# Patient Record
Sex: FEMALE | Race: WHITE | Employment: FULL TIME | URBAN - NONMETROPOLITAN AREA
[De-identification: names, ages, dates, MRNs, and addresses within clinical notes are randomized per-mention and may not be internally consistent; named-entity substitution may affect disease eponyms.]

---

## 2024-04-24 ENCOUNTER — HOSPITAL ENCOUNTER (OUTPATIENT)
Age: 19
Discharge: HOME | End: 2024-04-26
Payer: COMMERCIAL

## 2024-04-24 ENCOUNTER — OFFICE VISIT (OUTPATIENT)
Dept: CARDIOLOGY | Age: 19
End: 2024-04-24
Payer: COMMERCIAL

## 2024-04-24 VITALS
BODY MASS INDEX: 21.69 KG/M2 | HEIGHT: 66 IN | OXYGEN SATURATION: 99 % | SYSTOLIC BLOOD PRESSURE: 117 MMHG | WEIGHT: 135 LBS | DIASTOLIC BLOOD PRESSURE: 79 MMHG | HEART RATE: 84 BPM | RESPIRATION RATE: 18 BRPM

## 2024-04-24 DIAGNOSIS — G90.A POTS (POSTURAL ORTHOSTATIC TACHYCARDIA SYNDROME): ICD-10-CM

## 2024-04-24 DIAGNOSIS — R00.2 PALPITATIONS: ICD-10-CM

## 2024-04-24 DIAGNOSIS — G90.A POTS (POSTURAL ORTHOSTATIC TACHYCARDIA SYNDROME): Primary | ICD-10-CM

## 2024-04-24 DIAGNOSIS — R55 SYNCOPE, UNSPECIFIED SYNCOPE TYPE: ICD-10-CM

## 2024-04-24 DIAGNOSIS — R07.9 CHEST PAIN, UNSPECIFIED TYPE: ICD-10-CM

## 2024-04-24 LAB — ECHO BSA: 1.69 M2

## 2024-04-24 PROCEDURE — 93000 ELECTROCARDIOGRAM COMPLETE: CPT | Performed by: INTERNAL MEDICINE

## 2024-04-24 PROCEDURE — 93243 EXT ECG>48HR<7D SCAN A/R: CPT

## 2024-04-24 PROCEDURE — 99204 OFFICE O/P NEW MOD 45 MIN: CPT | Performed by: PHYSICIAN ASSISTANT

## 2024-04-24 RX ORDER — CITALOPRAM 20 MG/1
20 TABLET ORAL DAILY
COMMUNITY
Start: 2024-01-25 | End: 2025-01-24

## 2024-04-24 RX ORDER — FLUDROCORTISONE ACETATE 0.1 MG/1
1 TABLET ORAL DAILY
COMMUNITY

## 2024-04-24 RX ORDER — ACETAMINOPHEN AND CODEINE PHOSPHATE 120; 12 MG/5ML; MG/5ML
1 SOLUTION ORAL DAILY
COMMUNITY

## 2024-04-24 RX ORDER — MELOXICAM 15 MG/1
TABLET ORAL
COMMUNITY
Start: 2024-02-06

## 2024-04-24 NOTE — PROGRESS NOTES
I, Denisa Almonte am scribing for and in the presence of Unique Everett PA-C.    Patient: Jazmín Crandall  : 2005  Date of Visit: 2024    REASON FOR VISIT / CONSULTATION: New Patient (Pt is here to establish care for POTS. Pt was in ER on 24 for syncope. Pt has passed out 2 dozen times since last August. She said when this happens hear heart flutters, dizziness, her mouth gets dry, chest can gets tight, clammy and sweating. She was born with a heart murmur and had to see a cardiologist when she was young and stopped seeing the cardiologist was when she was 10. She saw a neurologist and was told she does not have seizures. She was told she has pulmonary artery stenosis.)      History of Present Illness:        Dear VENESSA TAVERA, APRN - CNP,     I had the pleasure of seeing Jazmín Crandall today. Ms. Crandall is a 18 y.o. female  with a history of POTS. She also has asthma and previously pulmonary artery stenosis.     She previously saw [facility]ology for her pulmonary valve stenosis (resolved per echo 2016). Last appointment with them was 2021: Per summary of last office note, \"Her dizziness postural related and most likely of a neurocardiogenic etiology. The echo today showed no evidence of left pulmonary artery stenosis, the echo was normal apart from trivial mitral valve regurgitation. I recommend repeating the study in 5 years to reassess the mitral valve. In order to ascertain more information about her symptoms and to rule out arrhythmia, I asked the family to keep a symptom diary and would like to see her back in 3 months. In the meantime, there is no need for restriction of activity, cardiac medication or SBE prophylaxis.\"     She has also seen a neurologist and seizure disorder was ruled out during her hospitalization, she never followed with neurology.     She denies any history of smoking, drug, or alcohol abuse.    Family history includes father

## 2024-04-24 NOTE — PROGRESS NOTES
I, {[facility] MAs:[id number]} am scribing for and in the presence of {[facility] Doctors:[id number]::\"Nick Thompson MD, MS, LYNN\"}.    Patient: Jazmín Crandall  : 2005  Date of Visit: 2024    REASON FOR VISIT / CONSULTATION: New Patient (Pt is here to establish care for POTS. Pt was in ER on 24 for near syncope. Pt is doing )      History of Present Illness:        Dear VENESSA TAVERA, APRN - CNP,     I had the pleasure of seeing Jazmín Crandall today. Ms. Crandall is a 18 y.o. female  with a history of POTS.    She previously saw [facility] cardiology for her pulmonary valve stenosis (resolved per echo ). Last appointment with them was 2021: Per summary of last office note, \"Her dizziness postural related and most likely of a neurocardiogenic etiology. The echo today showed no evidence of left pulmonary artery stenosis, the echo was normal apart from trivial mitral valve regurgitation. I recommend repeating the study in 5 years to reassess the mitral valve. In order to ascertain more information about her symptoms and to rule out arrhythmia, I asked the family to keep a symptom diary and would like to see her back in 3 months. In the meantime, there is no need for restriction of activity, cardiac medication or SBE prophylaxis.\"     She has also seen a neurologist and seizure disorder was ruled out.     She denies any history of smoking, drug, or alcohol abuse.    Family history includes father with hypertension and mother with a murmur.    Echo done 2016: Trivial left pulmonary artery stenosis, peak gradient 1.9 m/s. No valvular abnormalities. Normal chamber sizes. Normal right and left ventricular systolic function. No pericardial effusion.     Echo done 2021: No structural heart abnormalities. Normal branch pulmonary arteries. Trivial mitral valve regurgitation. Normal biventricular size and systolic function. No pericardial effusion.     Monitor done 2024:

## 2024-05-13 ENCOUNTER — HOSPITAL ENCOUNTER (OUTPATIENT)
Age: 19
Discharge: HOME | End: 2024-05-15
Payer: COMMERCIAL

## 2024-05-13 VITALS
BODY MASS INDEX: 21.68 KG/M2 | DIASTOLIC BLOOD PRESSURE: 79 MMHG | SYSTOLIC BLOOD PRESSURE: 117 MMHG | WEIGHT: 134.92 LBS | HEIGHT: 66 IN

## 2024-05-13 DIAGNOSIS — R07.9 CHEST PAIN, UNSPECIFIED TYPE: ICD-10-CM

## 2024-05-13 DIAGNOSIS — R55 SYNCOPE, UNSPECIFIED SYNCOPE TYPE: ICD-10-CM

## 2024-05-13 DIAGNOSIS — G90.A POTS (POSTURAL ORTHOSTATIC TACHYCARDIA SYNDROME): ICD-10-CM

## 2024-05-13 DIAGNOSIS — R00.2 PALPITATIONS: ICD-10-CM

## 2024-05-13 LAB
ECHO AO ROOT DIAM: 1.5 CM
ECHO AO ROOT INDEX: 0.89 CM/M2
ECHO AO SINUS VALSALVA DIAM: 2.2 CM
ECHO AO SINUS VALSALVA INDEX: 1.3 CM/M2
ECHO AV CUSP MM: 1.7 CM
ECHO AV MEAN GRADIENT: 4 MMHG
ECHO AV MEAN VELOCITY: 0.9 M/S
ECHO AV PEAK GRADIENT: 8 MMHG
ECHO AV PEAK VELOCITY: 1.4 M/S
ECHO AV VELOCITY RATIO: 0.79
ECHO AV VTI: 26.8 CM
ECHO BSA: 1.69 M2
ECHO EST RA PRESSURE: 3 MMHG
ECHO LA AREA 2C: 10.7 CM2
ECHO LA AREA 4C: 14.5 CM2
ECHO LA MAJOR AXIS: 5.1 CM
ECHO LA MINOR AXIS: 4.4 CM
ECHO LA VOL BP: 27 ML (ref 22–52)
ECHO LA VOL MOD A2C: 21 ML (ref 22–52)
ECHO LA VOL MOD A4C: 31 ML (ref 22–52)
ECHO LA VOL/BSA BIPLANE: 16 ML/M2 (ref 16–34)
ECHO LA VOLUME INDEX MOD A2C: 12 ML/M2 (ref 16–34)
ECHO LA VOLUME INDEX MOD A4C: 18 ML/M2 (ref 16–34)
ECHO LV E' LATERAL VELOCITY: 22 CM/S
ECHO LV EDV A2C: 60 ML
ECHO LV EDV A4C: 43 ML
ECHO LV EDV INDEX A4C: 25 ML/M2
ECHO LV EDV NDEX A2C: 36 ML/M2
ECHO LV EJECTION FRACTION A2C: 65 %
ECHO LV EJECTION FRACTION A4C: 61 %
ECHO LV EJECTION FRACTION BIPLANE: 63 % (ref 55–100)
ECHO LV ESV A2C: 21 ML
ECHO LV ESV A4C: 17 ML
ECHO LV ESV INDEX A2C: 12 ML/M2
ECHO LV ESV INDEX A4C: 10 ML/M2
ECHO LV FRACTIONAL SHORTENING: 32 % (ref 28–44)
ECHO LV INTERNAL DIMENSION DIASTOLE INDEX: 2.43 CM/M2
ECHO LV INTERNAL DIMENSION DIASTOLIC: 4.1 CM (ref 3.9–5.3)
ECHO LV INTERNAL DIMENSION SYSTOLIC INDEX: 1.66 CM/M2
ECHO LV INTERNAL DIMENSION SYSTOLIC: 2.8 CM
ECHO LV IVSD: 0.6 CM (ref 0.6–0.9)
ECHO LV MASS 2D: 67.1 G (ref 67–162)
ECHO LV MASS INDEX 2D: 39.7 G/M2 (ref 43–95)
ECHO LV POSTERIOR WALL DIASTOLIC: 0.6 CM (ref 0.6–0.9)
ECHO LV RELATIVE WALL THICKNESS RATIO: 0.29
ECHO LVOT AV VTI INDEX: 0.79
ECHO LVOT MEAN GRADIENT: 3 MMHG
ECHO LVOT PEAK GRADIENT: 5 MMHG
ECHO LVOT PEAK VELOCITY: 1.1 M/S
ECHO LVOT VTI: 21.1 CM
ECHO MV A VELOCITY: 0.47 M/S
ECHO MV E DECELERATION TIME (DT): 190 MS
ECHO MV E VELOCITY: 0.95 M/S
ECHO MV E/A RATIO: 2.02
ECHO MV E/E' LATERAL: 4.32
ECHO PV MAX VELOCITY: 1.1 M/S
ECHO PV PEAK GRADIENT: 4 MMHG
ECHO RIGHT VENTRICULAR SYSTOLIC PRESSURE (RVSP): 22 MMHG
ECHO TV REGURGITANT MAX VELOCITY: 2.2 M/S
ECHO TV REGURGITANT PEAK GRADIENT: 19 MMHG
STRESS BASELINE DIAS BP: 62 MMHG
STRESS BASELINE HR: 70 BPM
STRESS BASELINE SYS BP: 100 MMHG
STRESS ESTIMATED WORKLOAD: 12.1 METS
STRESS EXERCISE DUR MIN: 10 MIN
STRESS EXERCISE DUR SEC: 15 SEC
STRESS PEAK DIAS BP: 66 MMHG
STRESS PEAK SYS BP: 112 MMHG
STRESS PERCENT HR ACHIEVED: 85 %
STRESS POST PEAK HR: 171 BPM
STRESS RATE PRESSURE PRODUCT: NORMAL BPM*MMHG
STRESS STAGE 1 BP: NORMAL MMHG
STRESS STAGE 1 DURATION: 3 MIN:SEC
STRESS STAGE 1 HR: 99 BPM
STRESS STAGE 2 BP: NORMAL MMHG
STRESS STAGE 2 DURATION: 6 MIN:SEC
STRESS STAGE 2 HR: 123 BPM
STRESS STAGE 3 DURATION: 9 MIN:SEC
STRESS STAGE 3 HR: 131 BPM
STRESS STAGE RECOVERY 1 BP: NORMAL MMHG
STRESS STAGE RECOVERY 1 DURATION: 0 MIN:SEC
STRESS STAGE RECOVERY 1 HR: 171 BPM
STRESS STAGE RECOVERY 2 DURATION: 1 MIN:SEC
STRESS STAGE RECOVERY 2 HR: 131 BPM
STRESS STAGE RECOVERY 3 BP: NORMAL MMHG
STRESS STAGE RECOVERY 3 DURATION: 3 MIN:SEC
STRESS STAGE RECOVERY 3 HR: 97 BPM
STRESS STAGE RECOVERY 4 BP: NORMAL MMHG
STRESS STAGE RECOVERY 4 DURATION: 5 MIN:SEC
STRESS STAGE RECOVERY 4 HR: 93 BPM
STRESS TARGET HR: 201 BPM
TILT CV INITIAL SUPINE HEART RATE: 87 BPM
TILT CV INITIAL SUPINE MAX BP: NORMAL BPM
TILT CV INITIAL SUPINE RHYTHM: NORMAL
TILT CV INITIAL TILT BLOOD PRESSURE: NORMAL MMHG
TILT CV INITIAL TILT HEART RATE: 95 BPM
TILT CV INITIAL TILT RHYTHM: NORMAL
TILT CV MAX BP BLOOD PRESSURE: NORMAL MMHG
TILT CV MAX BP HEART RATE: 101 BPM
TILT CV MAX BP MINUTES: 12
TILT CV MAX BP RHYTHM: NORMAL
TILT CV MAX HEART RATE: 103 BPM
TILT CV MAX HR BLOOD PRESSURE: NORMAL MMHG
TILT CV MAX HR MINUTES: 5
TILT CV MAX HR RHYTHM: NORMAL
TILT CV MINIMUM BP BLOOD PRESSURE: NORMAL MMHG
TILT CV MINIMUM BP HEART RATE: 90 BPM
TILT CV MINIMUM BP MINUTES: 19
TILT CV MINIMUM BP RHYTHM: NORMAL
TILT CV MINIMUM HR BP: NORMAL MMHG
TILT CV MINIMUM HR HEART RATE: 90 BPM
TILT CV MINIMUM HR MINUTES: 19
TILT CV MINIMUM HR RHYTHM: NORMAL

## 2024-05-13 PROCEDURE — 93306 TTE W/DOPPLER COMPLETE: CPT

## 2024-05-13 PROCEDURE — 93660 TILT TABLE EVALUATION: CPT

## 2024-05-13 PROCEDURE — 93018 CV STRESS TEST I&R ONLY: CPT | Performed by: FAMILY MEDICINE

## 2024-05-13 PROCEDURE — 93016 CV STRESS TEST SUPVJ ONLY: CPT | Performed by: FAMILY MEDICINE

## 2024-05-13 PROCEDURE — 93660 TILT TABLE EVALUATION: CPT | Performed by: FAMILY MEDICINE

## 2024-05-13 PROCEDURE — 93306 TTE W/DOPPLER COMPLETE: CPT | Performed by: INTERNAL MEDICINE

## 2024-05-13 PROCEDURE — 93017 CV STRESS TEST TRACING ONLY: CPT

## 2024-05-15 ENCOUNTER — TELEPHONE (OUTPATIENT)
Dept: CARDIOLOGY | Age: 19
End: 2024-05-15

## 2024-05-15 NOTE — RESULT ENCOUNTER NOTE
Please let them know their tilt table test was abnormal. Stress and echo are normal. Please continue increase fluid intake, moderate salt intake, and compression socks. We will discuss at follow up.

## 2024-05-15 NOTE — TELEPHONE ENCOUNTER
----- Message from Unique Everett PA-C sent at 5/15/2024 11:22 AM EDT -----  Please let them know their tilt table test was abnormal. Stress and echo are normal. Please continue increase fluid intake, moderate salt intake, and compression socks. We will discuss at follow up.

## 2024-05-17 ENCOUNTER — OFFICE VISIT (OUTPATIENT)
Dept: CARDIOLOGY | Age: 19
End: 2024-05-17
Payer: COMMERCIAL

## 2024-05-17 VITALS
BODY MASS INDEX: 21.24 KG/M2 | HEART RATE: 85 BPM | WEIGHT: 132.2 LBS | RESPIRATION RATE: 18 BRPM | HEIGHT: 66 IN | DIASTOLIC BLOOD PRESSURE: 67 MMHG | OXYGEN SATURATION: 100 % | SYSTOLIC BLOOD PRESSURE: 103 MMHG

## 2024-05-17 DIAGNOSIS — R07.9 CHEST PAIN, UNSPECIFIED TYPE: ICD-10-CM

## 2024-05-17 DIAGNOSIS — R00.2 PALPITATIONS: ICD-10-CM

## 2024-05-17 DIAGNOSIS — R55 SYNCOPE, UNSPECIFIED SYNCOPE TYPE: ICD-10-CM

## 2024-05-17 DIAGNOSIS — G90.A POTS (POSTURAL ORTHOSTATIC TACHYCARDIA SYNDROME): Primary | ICD-10-CM

## 2024-05-17 LAB — ECHO BSA: 1.69 M2

## 2024-05-17 PROCEDURE — 99214 OFFICE O/P EST MOD 30 MIN: CPT | Performed by: INTERNAL MEDICINE

## 2024-05-17 NOTE — PROGRESS NOTES
I, Cynthia Stephens am scribing for and in the presence of Margie Garrido MD, F.A.C.C..    Patient: Jazmín Crandall  : 2005  Date of Visit: May 17, 2024    REASON FOR VISIT / CONSULTATION: Dizziness (HX:POTs Pt is here for follow up she states she is doing well now that she is taking meds correctly, she still has low BP that is manageable, occasional flutters, lightheaded/dizziness denies any falls  Denies:CP,  sob , )      History of Present Illness:        Dear VENESSA TAVERA, APRN - CNP,     I had the pleasure of seeing Jazmín Crandall today. Ms. Crandall is a 18 y.o. female  with a history of POTS. She also has asthma and previously pulmonary artery stenosis.     She previously saw [facility] cardiology for her pulmonary valve stenosis (resolved per echo 2016). Last appointment with them was 2021: Per summary of last office note, \"Her dizziness postural related and most likely of a neurocardiogenic etiology. The echo today showed no evidence of left pulmonary artery stenosis, the echo was normal apart from trivial mitral valve regurgitation. I recommend repeating the study in 5 years to reassess the mitral valve. In order to ascertain more information about her symptoms and to rule out arrhythmia, I asked the family to keep a symptom diary and would like to see her back in 3 months. In the meantime, there is no need for restriction of activity, cardiac medication or SBE prophylaxis.\"     She has also seen a neurologist and seizure disorder was ruled out during her hospitalization, she never followed with neurology.     EEG done on 2023- During this period of Patient's EEG monitoring, background   activities with appropriate architecture are noted.  There are no interictal epileptiform discharges seen throughout the recording. No electrographic seizures were captured during this period. There are MULTIPLE spontaneous clinical target events captured, with no EEG correlate, and are

## 2024-05-20 ENCOUNTER — TELEPHONE (OUTPATIENT)
Dept: CARDIOLOGY | Age: 19
End: 2024-05-20

## 2024-05-20 LAB — ECHO BSA: 1.69 M2

## 2024-05-20 NOTE — TELEPHONE ENCOUNTER
----- Message from Unique Everett PA-C sent at 5/20/2024 10:24 AM EDT -----  Please let them know that their CAM monitor was overall unremarkable. We will discuss at their follow up appointment.

## 2025-01-16 ENCOUNTER — TELEPHONE (OUTPATIENT)
Dept: CARDIOLOGY | Age: 20
End: 2025-01-16

## 2025-01-16 ENCOUNTER — HOSPITAL ENCOUNTER (OUTPATIENT)
Age: 20
Discharge: HOME | End: 2025-01-16
Payer: COMMERCIAL

## 2025-01-16 ENCOUNTER — OFFICE VISIT (OUTPATIENT)
Dept: CARDIOLOGY | Age: 20
End: 2025-01-16
Payer: COMMERCIAL

## 2025-01-16 VITALS
SYSTOLIC BLOOD PRESSURE: 109 MMHG | HEIGHT: 66 IN | BODY MASS INDEX: 21.83 KG/M2 | OXYGEN SATURATION: 100 % | DIASTOLIC BLOOD PRESSURE: 68 MMHG | WEIGHT: 135.8 LBS | RESPIRATION RATE: 18 BRPM | HEART RATE: 78 BPM

## 2025-01-16 DIAGNOSIS — R00.2 PALPITATIONS: ICD-10-CM

## 2025-01-16 DIAGNOSIS — G90.A POTS (POSTURAL ORTHOSTATIC TACHYCARDIA SYNDROME): ICD-10-CM

## 2025-01-16 DIAGNOSIS — G90.A POTS (POSTURAL ORTHOSTATIC TACHYCARDIA SYNDROME): Primary | ICD-10-CM

## 2025-01-16 LAB
ALBUMIN SERPL-MCNC: 4.7 G/DL (ref 3.5–5.2)
ALBUMIN/GLOB SERPL: 2.2 {RATIO} (ref 1–2.5)
ALP SERPL-CCNC: 67 U/L (ref 35–104)
ALT SERPL-CCNC: 13 U/L (ref 10–35)
ANION GAP SERPL CALCULATED.3IONS-SCNC: 8 MMOL/L (ref 9–16)
AST SERPL-CCNC: 18 U/L (ref 10–35)
BILIRUB SERPL-MCNC: 0.9 MG/DL (ref 0–1.2)
BUN SERPL-MCNC: 11 MG/DL (ref 6–20)
BUN/CREAT SERPL: 16 (ref 9–20)
CALCIUM SERPL-MCNC: 9.4 MG/DL (ref 8.6–10.4)
CHLORIDE SERPL-SCNC: 105 MMOL/L (ref 98–107)
CO2 SERPL-SCNC: 27 MMOL/L (ref 20–31)
CREAT SERPL-MCNC: 0.7 MG/DL (ref 0.5–0.9)
ERYTHROCYTE [DISTWIDTH] IN BLOOD BY AUTOMATED COUNT: 11.7 % (ref 11.8–14.4)
GFR, ESTIMATED: >90 ML/MIN/1.73M2
GLUCOSE SERPL-MCNC: 67 MG/DL (ref 74–99)
HCT VFR BLD AUTO: 35.9 % (ref 36.3–47.1)
HGB BLD-MCNC: 12.7 G/DL (ref 11.9–15.1)
MCH RBC QN AUTO: 32 PG (ref 25.2–33.5)
MCHC RBC AUTO-ENTMCNC: 35.4 G/DL (ref 28.4–34.8)
MCV RBC AUTO: 90.4 FL (ref 82.6–102.9)
NRBC BLD-RTO: 0 PER 100 WBC
PLATELET # BLD AUTO: 199 K/UL (ref 138–453)
PMV BLD AUTO: 10.7 FL (ref 8.1–13.5)
POTASSIUM SERPL-SCNC: 4.2 MMOL/L (ref 3.7–5.3)
PROT SERPL-MCNC: 6.8 G/DL (ref 6.6–8.7)
RBC # BLD AUTO: 3.97 M/UL (ref 3.95–5.11)
SODIUM SERPL-SCNC: 140 MMOL/L (ref 136–145)
WBC OTHER # BLD: 4.8 K/UL (ref 4.5–13.5)

## 2025-01-16 PROCEDURE — 99214 OFFICE O/P EST MOD 30 MIN: CPT | Performed by: PHYSICIAN ASSISTANT

## 2025-01-16 PROCEDURE — 80053 COMPREHEN METABOLIC PANEL: CPT

## 2025-01-16 PROCEDURE — 36415 COLL VENOUS BLD VENIPUNCTURE: CPT

## 2025-01-16 PROCEDURE — 85027 COMPLETE CBC AUTOMATED: CPT

## 2025-01-16 NOTE — TELEPHONE ENCOUNTER
----- Message from Unique Everett PA-C sent at 1/16/2025  4:17 PM EST -----  Please notify patient that their lab results are normal.   Please continue current treatment and follow up.

## 2025-01-16 NOTE — PROGRESS NOTES
features suggestive of psychogenic MACK sub-typing.     She denies any history of smoking, drug, or alcohol abuse.    Family history includes father with hypertension and a stroke around age 40 and mother with a murmur.    Echo done 5/19/2016: Trivial left pulmonary artery stenosis, peak gradient 1.9 m/s. No valvular abnormalities. Normal chamber sizes. Normal right and left ventricular systolic function. No pericardial effusion.     Echo done 9/2/2021: No structural heart abnormalities. Normal branch pulmonary arteries. Trivial mitral valve regurgitation. Normal biventricular size and systolic function. No pericardial effusion.     Monitor done 4/9/2024: Predominant rhythm is sinus. No significant tachycardia or bradycardia arrhythmias identified. No significant ST changes to suggest ischemia. Multiple transmitted rhythm strips reviewed.  Predominant sinus rhythm noted.  Isolated VPCs.  Although there are multiple patient triggered events there are no associated symptoms mentioned.  Average heart rate 80 bpm, minimum heart rate 49 bpm, sinus bradycardia, max heart rate 145 beats minute, sinus tachycardia.  No atrial fibrillation, atrial flutter, SVT, V. tach, AV block of second-degree or high-grade or pauses greater than 3 seconds identified.  Less than 1% bradycardic events, no tachycardic events 5% controlled heart rate 95%.     EKG done in the office 4/24/24: Normal sinus rhythm with sinus arrhythmia, normal ECG.     Echo done on 5/13/2024: EF 60-65%  Left ventricle size is normal. Normal wall thickness. Normal wall motion. Normal diastolic function. Right Ventricle: Right ventricle size is normal. Normal systolic function. Mitral Valve: Trace regurgitation. Tricuspid Valve: Trace regurgitation. The estimated RVSP is 22 mmHg.    Exercise stress test done on 5/13/2024: ECG: Resting ECG demonstrates normal sinus rhythm. Stress Test: A Bunny protocol stress test was performed. The patient reached stage 3 of the

## 2025-08-28 ENCOUNTER — OFFICE VISIT (OUTPATIENT)
Dept: CARDIOLOGY | Age: 20
End: 2025-08-28
Payer: COMMERCIAL

## 2025-08-28 VITALS
OXYGEN SATURATION: 100 % | HEART RATE: 79 BPM | RESPIRATION RATE: 18 BRPM | BODY MASS INDEX: 22.82 KG/M2 | SYSTOLIC BLOOD PRESSURE: 109 MMHG | HEIGHT: 66 IN | DIASTOLIC BLOOD PRESSURE: 67 MMHG | WEIGHT: 142 LBS

## 2025-08-28 DIAGNOSIS — R55 SYNCOPE, UNSPECIFIED SYNCOPE TYPE: ICD-10-CM

## 2025-08-28 DIAGNOSIS — R00.2 PALPITATIONS: ICD-10-CM

## 2025-08-28 DIAGNOSIS — R00.2 INTERMITTENT PALPITATIONS: ICD-10-CM

## 2025-08-28 DIAGNOSIS — R07.89 ATYPICAL CHEST PAIN: ICD-10-CM

## 2025-08-28 DIAGNOSIS — G90.A POTS (POSTURAL ORTHOSTATIC TACHYCARDIA SYNDROME): Primary | ICD-10-CM

## 2025-08-28 DIAGNOSIS — J45.909 ASTHMA, UNSPECIFIED ASTHMA SEVERITY, UNSPECIFIED WHETHER COMPLICATED, UNSPECIFIED WHETHER PERSISTENT: ICD-10-CM

## 2025-08-28 PROCEDURE — 99214 OFFICE O/P EST MOD 30 MIN: CPT | Performed by: NURSE PRACTITIONER

## 2025-08-28 PROCEDURE — 93000 ELECTROCARDIOGRAM COMPLETE: CPT | Performed by: NURSE PRACTITIONER

## 2025-08-28 RX ORDER — FLUDROCORTISONE ACETATE 0.1 MG/1
0.1 TABLET ORAL EVERY MORNING
Qty: 90 TABLET | Refills: 3 | Status: SHIPPED | OUTPATIENT
Start: 2025-08-28